# Patient Record
Sex: MALE | Race: WHITE
[De-identification: names, ages, dates, MRNs, and addresses within clinical notes are randomized per-mention and may not be internally consistent; named-entity substitution may affect disease eponyms.]

---

## 2017-12-29 ENCOUNTER — HOSPITAL ENCOUNTER (OUTPATIENT)
Dept: HOSPITAL 88 - ST | Age: 67
LOS: 2 days | End: 2017-12-31
Attending: INTERNAL MEDICINE
Payer: MEDICARE

## 2017-12-29 DIAGNOSIS — R13.13: Primary | ICD-10-CM

## 2017-12-29 PROCEDURE — 92526 ORAL FUNCTION THERAPY: CPT

## 2017-12-29 PROCEDURE — 92610 EVALUATE SWALLOWING FUNCTION: CPT

## 2018-01-19 ENCOUNTER — HOSPITAL ENCOUNTER (OUTPATIENT)
Dept: HOSPITAL 88 - DX | Age: 68
End: 2018-01-19
Attending: FAMILY MEDICINE
Payer: MEDICARE

## 2018-01-19 DIAGNOSIS — I69.898: ICD-10-CM

## 2018-01-19 DIAGNOSIS — R13.13: Primary | ICD-10-CM

## 2018-01-19 PROCEDURE — 92611 MOTION FLUOROSCOPY/SWALLOW: CPT

## 2018-01-19 PROCEDURE — 74230 X-RAY XM SWLNG FUNCJ C+: CPT

## 2018-01-19 NOTE — DIAGNOSTIC IMAGING REPORT
PROCEDURE:X-RAY MODIFIED BARIUM SWALLOW

 

COMPARISON:[facility], DX, MODIFIED JAZMINE. SWALLOW, 

11/28/2017, 12:06.

 

INDICATIONS:SILENT ASPIRATION

 

DISCUSSION:Fluoroscopic examination was performed in conjunction with 

speech pathology, during swallowing of a variety of thin and thick 

liquid consistencies.

Examination showed premature spillage vallecula and piriform sinus with 

no consistencies.

Consistent silent penetration was noted with thin liquids.

Consistent silent aspiration was noted with thin liquids.

Moderate vallecular residue and minimal piriform sinus residue.

 

 

CONCLUSION:Mild-to-moderate pharyngeal dysphagia characterized by 

silent aspiration of thin liquids, reduced to deep penetration 

utilizing chin tucked position. Please see the report from speech 

pathology for complete details.

 

 

 

Zack Mosquera M.D.  

Dictated by:  Zack Mosquera M.D. on 1/19/2018 at 15:31     

Electronically approved by:  Zack Mosquera M.D. on 1/19/2018 at 

15:31

## 2018-01-23 ENCOUNTER — HOSPITAL ENCOUNTER (OUTPATIENT)
Dept: HOSPITAL 88 - ST | Age: 68
LOS: 8 days | End: 2018-01-31
Attending: INTERNAL MEDICINE
Payer: MEDICARE

## 2018-01-23 DIAGNOSIS — R13.13: Primary | ICD-10-CM

## 2018-01-23 DIAGNOSIS — Z86.73: ICD-10-CM

## 2018-01-23 PROCEDURE — 92526 ORAL FUNCTION THERAPY: CPT

## 2018-02-23 ENCOUNTER — HOSPITAL ENCOUNTER (OUTPATIENT)
Dept: HOSPITAL 88 - ST | Age: 68
LOS: 5 days | End: 2018-02-28
Attending: INTERNAL MEDICINE
Payer: MEDICARE

## 2018-02-23 DIAGNOSIS — Z86.73: ICD-10-CM

## 2018-02-23 DIAGNOSIS — R13.13: Primary | ICD-10-CM

## 2018-03-13 ENCOUNTER — HOSPITAL ENCOUNTER (OUTPATIENT)
Dept: HOSPITAL 88 - ST | Age: 68
LOS: 18 days | End: 2018-03-31
Attending: INTERNAL MEDICINE
Payer: MEDICARE

## 2018-03-13 DIAGNOSIS — R13.13: Primary | ICD-10-CM

## 2018-03-16 ENCOUNTER — HOSPITAL ENCOUNTER (OUTPATIENT)
Dept: HOSPITAL 88 - DX | Age: 68
End: 2018-03-16
Attending: INTERNAL MEDICINE
Payer: MEDICARE

## 2018-03-16 DIAGNOSIS — Z86.73: ICD-10-CM

## 2018-03-16 DIAGNOSIS — R13.13: Primary | ICD-10-CM

## 2018-03-16 PROCEDURE — 74230 X-RAY XM SWLNG FUNCJ C+: CPT

## 2018-03-16 PROCEDURE — 92611 MOTION FLUOROSCOPY/SWALLOW: CPT

## 2018-03-16 NOTE — DIAGNOSTIC IMAGING REPORT
PROCEDURE:X-RAY MODIFIED BARIUM SWALLOW

 

COMPARISON:None.

 

INDICATIONS:Dysphasia with previous strokes

 

DISCUSSION:Fluoroscopic examination was performed in conjunction with 

speech pathology, during swallowing of a variety of thin and thick 

liquid consistencies.

 

Fluoroscopy time: 1.7 minutes

Total dose: 12.74 mGy

 

 

CONCLUSION:There is laryngeal penetration into the vestibule with thin 

liquids and juice and mixed soft mechanical diet with and without chin 

tuck. Silent alexis aspiration is also noted. Please see the report from 

speech pathology for complete details.

 

 

 

Hi Wyatt D.O.  

Dictated by:  Hi Wyatt D.O. on 3/16/2018 at 14:22     

Electronically approved by:  Hi Wyatt D.O. on 3/16/2018 at 14:22

## 2019-09-23 ENCOUNTER — HOSPITAL ENCOUNTER (OUTPATIENT)
Dept: HOSPITAL 88 - CARD | Age: 69
End: 2019-09-23
Attending: FAMILY MEDICINE
Payer: MEDICARE

## 2019-09-23 DIAGNOSIS — R60.0: Primary | ICD-10-CM

## 2019-09-23 DIAGNOSIS — I77.1: ICD-10-CM

## 2019-09-23 PROCEDURE — 93925 LOWER EXTREMITY STUDY: CPT

## 2019-11-25 ENCOUNTER — HOSPITAL ENCOUNTER (OUTPATIENT)
Dept: HOSPITAL 88 - DX | Age: 69
End: 2019-11-25
Attending: OTOLARYNGOLOGY
Payer: MEDICARE

## 2019-11-25 DIAGNOSIS — R09.89: ICD-10-CM

## 2019-11-25 DIAGNOSIS — R63.8: ICD-10-CM

## 2019-11-25 DIAGNOSIS — R13.13: Primary | ICD-10-CM

## 2019-11-25 PROCEDURE — 74230 X-RAY XM SWLNG FUNCJ C+: CPT

## 2019-11-25 NOTE — DIAGNOSTIC IMAGING REPORT
PROCEDURE: X-RAY MODIFIED BARIUM SWALLOW



COMPARISON: None. 



INDICATION: Aspiration



Radiation Details:

Fluoroscopy time: 1.6 minutes

Cumulative dose: 4.8 mGy



DISCUSSION: Fluoroscopic examination was performed in conjunction with speech

pathology during swallowing a variety of thin and thick liquid consistencies.

Provided images demonstrate no laryngeal penetration or aspiration. 



CONCLUSION: 

Modified barium swallow demonstrating no laryngeal penetration or aspiration.

Please refer to the speech pathology report for further details.



Signed by: Viviana Ruffin MD on 11/25/2019 5:35 PM

## 2020-03-12 NOTE — NUR
Patient Name: Eliseo Cintron JrDOB: [birth year]

Age/Sex: 69 MaleOrdering Physician: David Alfred MD



Clinical Swallow Evaluation/Initial Treatment Session



Patient is a 69 year old male with diagnosis of dysphagia.  Pt participated in a modified 
barium 

swallow study (MBS) on 11/25/19.  Pt presented with mild oral and moderate to severe 
pharyngeal dysphagia with micro-aspiration and penetration of liquids and severe pharyngeal 
residue.  Dysphagia was judged to be secondary to reduced pharyngeal constriction, reduced 
UES opening/strength, reduced tongue base strength and reduced laryngeal elevation and 
excursion.  Recommendation was made for dysphagia therapy to increase strength and 
coordination of swallow.  Patient was seen in the outpatient clinic for treatment of 
dysphagia with Neuromuscular Electrical Stimulation (NMES) with VitalStim Therapy and 
traditional dysphagia therapy with pharyngeal exercises.  Pt participated in 12 therapy 
sessions.  MBS 1/25/20 pt presented with moderate pharyngeal dysphagia c/b consistent 
premature spillage over the base of tonuge, SILENT aspiration of thin liquids, and 
consistent pharyngeal residue after the swallow.  dysphagia was judged to be secondary to 
decreased hyolaryngeal excursion, decreased pharyngeal constriction, and decreased 
coordination of the swallow.  This is an improvement from the MBS completed 11/19 and 
supports continuation of treatment. Recommendation was made for dysphagia therapy to 
increase strength and coordination of swallow.  Patient was seen today in the outpatient 
clinic for initial treatment of Neuromuscular Electrical Stimulation (NMES) with VitalStim 
Therapy and traditional dysphagia therapy with pharyngeal exercises.



Pt was seen with spouse present.  Oral motor exam revealed function that was grossly within 
normal limits.  Patient tolerates room air.  Hearing appeared to be WFL.  Speech and 
language skills were functional.  Pt pleasantly confused, pt with constant wet vocal quality 
and   throat clearing



Provided extensive education re: need for therapy, purpose of exercises and NMES, and future 
plan of care.  Pt indicated understanding.  Pt was given water and hard candy.  Pt was 
instructed to take small sips and swallow hard, feeling all the muscles in her throat 
contract. Placement 3b was used to target the mylohyoid muscle, the anterior belly of the 
digastric muscle, the sternohyoid muscle, the omohyoid muscle, the geniohyoid muscle, and 
the middle pharyngeal constrictors.  Channel 1 of the electrodes was aligned horizontally 
just above the hyoid bone and channel 2 of the electrodes was aligned horizontally at the 
level of the thyroid notch.  This placement was used to improve base of tongue strength, 
pharyngeal constriction, and UES function.  Pt initially tolerated 10.5 mA, but as the 
session progressed pt tolerated 20.0 mA.  Pt received 50 minutes of stimulation.  Cough 
noted X 7, throat clear X 25+. Wet vocal quality throughout session.  During NMES an 
exercise program was presented, demonstrated, and discussed.  Pt completed the exercises 
with minimal assistance.  A home program was assigned.  Pt verbalized understanding of the 
home exercise program.  Education provided as indicated.  All questions were answered.   
Pt/daughter indicated that attending therapy 3x/week would be convenient



Impressions:  

Pt tolerated initial session of NMES well.  He continues to report and demonstrate s/s of 
aspiration during meals which significantly interferes with his quality of life.  Pt is an 
excellent candidate for dysphagia exercises and NMES for improvement of strength and 
coordination of swallow.  



Recommendations:

1.Dysphagia therapy to include traditional exercises and NMES 2X/week for 6 weeks for a 
total of 12 treatment sessions

2.Home exercise program

3.Repeat MBS in 4-6 weeks with new goals to be determined at that time



Long Term Goal: 

Pt will tolerate least restrictive diet without s/s of aspiration as judged by an objective 
evaluation.



Short Term Goals:

1.Pt will complete 3 repetitions of a set of dysphagia exercises to improve laryngeal 
elevation, base of tongue retraction, and laryngeal closure, 10 repetitions per exercise, 
with minimal cues.

2.Pt will tolerate NMES for 45  60 minutes with no clinical s/s of aspiration to improve 
strength of pharyngeal constrictors, hyolaryngeal excursion, and safety with po intake.

3.Pt will complete home dysphagia exercise program targeting laryngeal elevation, base of 
tongue strength, and cricopharyngeal function independently.

4.Pt will follow aspiration precautions with independence.

5.Pt will participate in a repeat Modified Barium Swallow study to objectively re-assess 
swallow safety and function and determine safest diet.







_________________________________

Anabel Vázquez M.S. CCC-SLP

Date of Session: 03/12/20

Dysphagia Evaluation and Treatment X 50 minutes



Physicians signature below certifies medical necessity for these skilled interventions







____________________________________________________

Physician SignatureDate





NOMS Rating for Swallowing:  Level 5

## 2020-03-13 ENCOUNTER — HOSPITAL ENCOUNTER (OUTPATIENT)
Dept: HOSPITAL 88 - ST | Age: 70
LOS: 18 days | End: 2020-03-31
Attending: OTOLARYNGOLOGY
Payer: MEDICARE

## 2020-03-13 DIAGNOSIS — R13.13: Primary | ICD-10-CM

## 2020-03-19 NOTE — NUR
ST:



Pt cx Speech Therapy OP today and tomorrow to social distance.  Will call next week if they 
choose to return to therapy.

## 2020-03-25 NOTE — NUR
ST Note:



Pt/spouse cancelled appointments for this week and next week secondary to social distancing 
to avoid COVID-19.

## 2020-06-12 NOTE — NUR
ST Note:



Pt's spouse called and cancelled appointment due to illness.  Pt plans to attend next 
scheduled appointment.

## 2020-06-17 NOTE — NUR
ST Note:



Pt's wife called to cancel appointment due to pt not well.  Per report, pt demonstrating 
increase in slurred speech and drooling.  Pt's spouse highly encouraged to either go 
directly to an ER or call his physician.  Pt's spouse reported pt did not want to go to ER 
because he did not want to be  from her.  Educated her that one visitor per pt 
allowed at many facilities, including this one, and that if pt is having a new CVA he needs 
to go to ER within three hours of onset of symptoms.  Pt's spouse indicated understanding 
and said she would speak with her  about going to ER.

## 2020-06-19 ENCOUNTER — HOSPITAL ENCOUNTER (OUTPATIENT)
Dept: HOSPITAL 88 - ST | Age: 70
LOS: 11 days | End: 2020-06-30
Attending: OTOLARYNGOLOGY
Payer: MEDICARE

## 2020-06-19 DIAGNOSIS — R13.13: Primary | ICD-10-CM

## 2020-06-24 NOTE — NUR
ST Note:



Pt's spouse called and cancelled all future appointments due to increasing number of cases 
of COVID-19 in the [place] area.  Return to clinic for continued dysphagia therapy will be 
discussed at a later date.